# Patient Record
Sex: MALE
[De-identification: names, ages, dates, MRNs, and addresses within clinical notes are randomized per-mention and may not be internally consistent; named-entity substitution may affect disease eponyms.]

---

## 2023-01-20 ENCOUNTER — APPOINTMENT (OUTPATIENT)
Dept: HEART AND VASCULAR | Facility: CLINIC | Age: 33
End: 2023-01-20
Payer: COMMERCIAL

## 2023-01-20 ENCOUNTER — NON-APPOINTMENT (OUTPATIENT)
Age: 33
End: 2023-01-20

## 2023-01-20 VITALS
HEART RATE: 90 BPM | SYSTOLIC BLOOD PRESSURE: 122 MMHG | HEIGHT: 68 IN | TEMPERATURE: 98.4 F | BODY MASS INDEX: 24.25 KG/M2 | WEIGHT: 160 LBS | OXYGEN SATURATION: 98 % | DIASTOLIC BLOOD PRESSURE: 86 MMHG

## 2023-01-20 DIAGNOSIS — Z82.49 FAMILY HISTORY OF ISCHEMIC HEART DISEASE AND OTHER DISEASES OF THE CIRCULATORY SYSTEM: ICD-10-CM

## 2023-01-20 DIAGNOSIS — Z83.438 FAMILY HISTORY OF OTHER DISORDER OF LIPOPROTEIN METABOLISM AND OTHER LIPIDEMIA: ICD-10-CM

## 2023-01-20 DIAGNOSIS — Z78.9 OTHER SPECIFIED HEALTH STATUS: ICD-10-CM

## 2023-01-20 PROCEDURE — 99203 OFFICE O/P NEW LOW 30 MIN: CPT

## 2023-01-20 PROCEDURE — 93000 ELECTROCARDIOGRAM COMPLETE: CPT

## 2023-01-24 PROBLEM — Z78.9 NON-SMOKER: Status: ACTIVE | Noted: 2023-01-20

## 2023-01-24 PROBLEM — Z82.49 FAMILY HISTORY OF MITRAL VALVE DISORDER: Status: ACTIVE | Noted: 2023-01-20

## 2023-01-24 PROBLEM — Z83.438 FAMILY HISTORY OF HYPERLIPIDEMIA: Status: ACTIVE | Noted: 2023-01-20

## 2023-01-24 PROBLEM — Z82.49 FAMILY HISTORY OF HYPOTENSION: Status: ACTIVE | Noted: 2023-01-20

## 2023-01-24 NOTE — ASSESSMENT
[FreeTextEntry1] : FHx MVP\par - asx, VSS, ECG wnl\par - check TTE\par - lenghty discussion re diagnosis and coarse of MVP longterm\par - further recommendations pending results \par \par RTC 3-4 wks via TH isit

## 2023-01-24 NOTE — HISTORY OF PRESENT ILLNESS
[FreeTextEntry1] : The patient is a 32 year-old, nonsmoker, male who presents for cardiac evaluation. The patient states that his mother and his aunt were both diagnosed with mitral valve prolapse and he is concerned that he may have it. The patient denies any PMHx or PSHx. He does not take any medications and denies any allergies. He denies any shortness of breath, palpitations, LE swelling, chest pain, or decreased exercise tolerance.. He drinks alcohol occasionally. No h/o syncope. Played sports during schooling w/o event. \par \par 1/20/23 ECG: NSR at 81 bpm, nl axis, early repol \par

## 2023-02-17 ENCOUNTER — APPOINTMENT (OUTPATIENT)
Dept: HEART AND VASCULAR | Facility: CLINIC | Age: 33
End: 2023-02-17
Payer: COMMERCIAL

## 2023-02-17 PROCEDURE — 93306 TTE W/DOPPLER COMPLETE: CPT

## 2023-02-24 ENCOUNTER — APPOINTMENT (OUTPATIENT)
Dept: HEART AND VASCULAR | Facility: CLINIC | Age: 33
End: 2023-02-24
Payer: COMMERCIAL

## 2023-02-24 DIAGNOSIS — I34.1 NONRHEUMATIC MITRAL (VALVE) PROLAPSE: ICD-10-CM

## 2023-02-24 DIAGNOSIS — Z00.00 ENCOUNTER FOR GENERAL ADULT MEDICAL EXAMINATION W/OUT ABNORMAL FINDINGS: ICD-10-CM

## 2023-02-24 PROCEDURE — 99442: CPT | Mod: 95

## 2023-02-24 NOTE — ASSESSMENT
[FreeTextEntry1] : 1. FHx MVP\par - asx, VSS, ECG wnl\par - 2/17/23 TTE c/w EF 70%, mild LV diastolic dysf, mild MR, no evidence of MVP \par - lengthy discussion re diagnosis and coarse of MVP longterm\par - repeat TTE in 3-5 years\par \par \par RTC PRN\par Spent 11 minutes on telehealth/phone visit, all questions answered in detail.

## 2023-02-24 NOTE — HISTORY OF PRESENT ILLNESS
[FreeTextEntry1] : The patient is a 32 year-old, nonsmoker, male who presents for cardiac evaluation. The patient states that his mother and his aunt were both diagnosed with mitral valve prolapse and he is concerned that he may have it. The patient denies any PMH or PSH. He does not take any medications and denies any allergies. He denies any shortness of breath, palpitations, LE swelling, chest pain, or decreased exercise tolerance. He drinks alcohol occasionally. \par \par \par 2/24/23 TH: dicussed results of TTE, no new complaints \par 1/20/23 OV: TTE ordered.\par \par \par 1/20/23 ECG: NSR at 86 bpm, nl axis\par

## 2023-02-25 ENCOUNTER — TRANSCRIPTION ENCOUNTER (OUTPATIENT)
Age: 33
End: 2023-02-25

## 2024-01-14 ENCOUNTER — APPOINTMENT (OUTPATIENT)
Dept: SLEEP CENTER | Facility: HOME HEALTH | Age: 34
End: 2024-01-14
Payer: COMMERCIAL

## 2024-01-14 PROCEDURE — 95800 SLP STDY UNATTENDED: CPT | Mod: 26

## 2024-01-15 ENCOUNTER — OUTPATIENT (OUTPATIENT)
Dept: OUTPATIENT SERVICES | Facility: HOSPITAL | Age: 34
LOS: 1 days | End: 2024-01-15
Payer: COMMERCIAL

## 2024-01-15 PROCEDURE — 95800 SLP STDY UNATTENDED: CPT

## 2024-01-18 DIAGNOSIS — G47.33 OBSTRUCTIVE SLEEP APNEA (ADULT) (PEDIATRIC): ICD-10-CM
